# Patient Record
Sex: MALE | Race: WHITE | Employment: UNEMPLOYED | URBAN - METROPOLITAN AREA
[De-identification: names, ages, dates, MRNs, and addresses within clinical notes are randomized per-mention and may not be internally consistent; named-entity substitution may affect disease eponyms.]

---

## 2021-05-28 ENCOUNTER — APPOINTMENT (OUTPATIENT)
Dept: CT IMAGING | Age: 21
End: 2021-05-28
Payer: COMMERCIAL

## 2021-05-28 ENCOUNTER — HOSPITAL ENCOUNTER (EMERGENCY)
Age: 21
Discharge: HOME | End: 2021-05-28
Payer: COMMERCIAL

## 2021-05-28 VITALS
RESPIRATION RATE: 16 BRPM | TEMPERATURE: 98.4 F | OXYGEN SATURATION: 98 % | BODY MASS INDEX: 20.04 KG/M2 | WEIGHT: 140 LBS | HEIGHT: 70 IN | DIASTOLIC BLOOD PRESSURE: 67 MMHG | SYSTOLIC BLOOD PRESSURE: 132 MMHG | HEART RATE: 80 BPM

## 2021-05-28 DIAGNOSIS — R10.84 GENERALIZED ABDOMINAL PAIN: Primary | ICD-10-CM

## 2021-05-28 DIAGNOSIS — R18.8 PELVIC ASCITES: ICD-10-CM

## 2021-05-28 LAB
A/G RATIO: 2 (ref 1.1–2.2)
ALBUMIN SERPL-MCNC: 4.9 G/DL (ref 3.4–5)
ALP BLD-CCNC: 77 U/L (ref 40–129)
ALT SERPL-CCNC: 11 U/L (ref 10–40)
ANION GAP SERPL CALCULATED.3IONS-SCNC: 7 MMOL/L (ref 3–16)
AST SERPL-CCNC: 13 U/L (ref 15–37)
BASOPHILS ABSOLUTE: 0 K/UL (ref 0–0.2)
BASOPHILS RELATIVE PERCENT: 0.9 %
BILIRUB SERPL-MCNC: 1.2 MG/DL (ref 0–1)
BILIRUBIN URINE: NEGATIVE
BLOOD, URINE: ABNORMAL
BUN BLDV-MCNC: 9 MG/DL (ref 7–20)
CALCIUM SERPL-MCNC: 9.5 MG/DL (ref 8.3–10.6)
CHLORIDE BLD-SCNC: 104 MMOL/L (ref 99–110)
CLARITY: CLEAR
CO2: 28 MMOL/L (ref 21–32)
COLOR: YELLOW
CREAT SERPL-MCNC: 0.6 MG/DL (ref 0.9–1.3)
EOSINOPHILS ABSOLUTE: 0 K/UL (ref 0–0.6)
EOSINOPHILS RELATIVE PERCENT: 0.1 %
EPITHELIAL CELLS, UA: ABNORMAL /HPF (ref 0–5)
GFR AFRICAN AMERICAN: >60
GFR NON-AFRICAN AMERICAN: >60
GLOBULIN: 2.4 G/DL
GLUCOSE BLD-MCNC: 98 MG/DL (ref 70–99)
GLUCOSE URINE: NEGATIVE MG/DL
HCT VFR BLD CALC: 41.7 % (ref 40.5–52.5)
HEMOGLOBIN: 14.4 G/DL (ref 13.5–17.5)
KETONES, URINE: NEGATIVE MG/DL
LEUKOCYTE ESTERASE, URINE: NEGATIVE
LIPASE: 12 U/L (ref 13–60)
LYMPHOCYTES ABSOLUTE: 1.7 K/UL (ref 1–5.1)
LYMPHOCYTES RELATIVE PERCENT: 30.4 %
MCH RBC QN AUTO: 30.6 PG (ref 26–34)
MCHC RBC AUTO-ENTMCNC: 34.5 G/DL (ref 31–36)
MCV RBC AUTO: 88.7 FL (ref 80–100)
MICROSCOPIC EXAMINATION: YES
MONOCYTES ABSOLUTE: 0.3 K/UL (ref 0–1.3)
MONOCYTES RELATIVE PERCENT: 5.3 %
MUCUS: ABNORMAL /LPF
NEUTROPHILS ABSOLUTE: 3.6 K/UL (ref 1.7–7.7)
NEUTROPHILS RELATIVE PERCENT: 63.3 %
NITRITE, URINE: NEGATIVE
PDW BLD-RTO: 13.7 % (ref 12.4–15.4)
PH UA: 6 (ref 5–8)
PLATELET # BLD: 256 K/UL (ref 135–450)
PMV BLD AUTO: 8.6 FL (ref 5–10.5)
POTASSIUM REFLEX MAGNESIUM: 4.1 MMOL/L (ref 3.5–5.1)
PROTEIN UA: NEGATIVE MG/DL
RBC # BLD: 4.7 M/UL (ref 4.2–5.9)
RBC UA: ABNORMAL /HPF (ref 0–4)
SODIUM BLD-SCNC: 139 MMOL/L (ref 136–145)
SPECIFIC GRAVITY UA: 1.01 (ref 1–1.03)
TOTAL PROTEIN: 7.3 G/DL (ref 6.4–8.2)
URINE REFLEX TO CULTURE: ABNORMAL
URINE TYPE: ABNORMAL
UROBILINOGEN, URINE: 0.2 E.U./DL
WBC # BLD: 5.7 K/UL (ref 4–11)
WBC UA: ABNORMAL /HPF (ref 0–5)

## 2021-05-28 PROCEDURE — 74177 CT ABD & PELVIS W/CONTRAST: CPT

## 2021-05-28 PROCEDURE — 6360000004 HC RX CONTRAST MEDICATION: Performed by: PHYSICIAN ASSISTANT

## 2021-05-28 PROCEDURE — 99283 EMERGENCY DEPT VISIT LOW MDM: CPT

## 2021-05-28 PROCEDURE — 2580000003 HC RX 258: Performed by: PHYSICIAN ASSISTANT

## 2021-05-28 PROCEDURE — 83690 ASSAY OF LIPASE: CPT

## 2021-05-28 PROCEDURE — 80053 COMPREHEN METABOLIC PANEL: CPT

## 2021-05-28 PROCEDURE — 81001 URINALYSIS AUTO W/SCOPE: CPT

## 2021-05-28 PROCEDURE — 85025 COMPLETE CBC W/AUTO DIFF WBC: CPT

## 2021-05-28 RX ORDER — 0.9 % SODIUM CHLORIDE 0.9 %
1000 INTRAVENOUS SOLUTION INTRAVENOUS ONCE
Status: COMPLETED | OUTPATIENT
Start: 2021-05-28 | End: 2021-05-28

## 2021-05-28 RX ADMIN — SODIUM CHLORIDE 1000 ML: 9 INJECTION, SOLUTION INTRAVENOUS at 14:07

## 2021-05-28 RX ADMIN — IOPAMIDOL 75 ML: 755 INJECTION, SOLUTION INTRAVENOUS at 14:44

## 2021-05-28 ASSESSMENT — ENCOUNTER SYMPTOMS
ABDOMINAL PAIN: 1
RESPIRATORY NEGATIVE: 1

## 2021-05-28 ASSESSMENT — PAIN DESCRIPTION - LOCATION: LOCATION: BACK;ABDOMEN

## 2021-05-28 ASSESSMENT — PAIN DESCRIPTION - PAIN TYPE: TYPE: ACUTE PAIN

## 2021-05-28 ASSESSMENT — PAIN DESCRIPTION - DESCRIPTORS: DESCRIPTORS: ACHING;SHARP

## 2021-05-28 ASSESSMENT — PAIN SCALES - GENERAL: PAINLEVEL_OUTOF10: 7

## 2021-05-28 NOTE — ED PROVIDER NOTES
[address] ED  EMERGENCY DEPARTMENT ENCOUNTER        Pt Name: Flakito Moreno  MRN: [id number]  [place] 2000  Date of evaluation: 5/28/2021  Provider: Natalia Marcelo PA-C  PCP: JORGE MIKE  Note Started: 1:36 PM EDT       GIANNA. I have evaluated this patient. My supervising physician was available for consultation. CHIEF COMPLAINT       Chief Complaint   Patient presents with    Flank Pain     Pt states abdominal pain that started about a week ago. Pt states that it has radiated into his back. Pt states worsening pain when he stands.  Abdominal Pain       HISTORY OF PRESENT ILLNESS   (Location, Timing/Onset, Context/Setting, Quality, Duration, Modifying Factors, Severity, Associated Signs and Symptoms)  Note limiting factors. Flakito Moreno is a 23 y.o. male who presents with a Chief Complaint of neurolysed abdominal pain which radiates to his bilateral flanks. 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 Onset of symptoms x1 week. Duration of symptoms have been persistent since onset. Context includes generalized abdominal pain with bilateral flank pain. Rates his pain a 7 out of 10. Denies any fevers, chills, nausea vomiting or urinary symptoms. Denies chest pain or shortness of breath. He has not taken anything at this time for pain control. Otherwise denies any other complaints. Nothing seems to make symptoms better or worse. Denies recent abdominal surgeries or sick contacts. Nursing Notes were all reviewed and agreed with or any disagreements were addressed in the HPI. REVIEW OF SYSTEMS    (2-9 systems for level 4, 10 or more for level 5)     Review of Systems   Constitutional: Negative. Respiratory: Negative. Cardiovascular: Negative. Gastrointestinal: Positive for abdominal pain. Genitourinary: Positive for flank pain. Skin: Negative. Neurological: Negative.         Positives and Pertinent negatives as per HPI. Except as noted above in the ROS, all other systems were reviewed and negative. PAST MEDICAL HISTORY     Past Medical History:   Diagnosis Date    Kidney duplication          SURGICAL HISTORY   History reviewed. No pertinent surgical history. CURRENTMEDICATIONS       Previous Medications    No medications on file         ALLERGIES     Penicillins    FAMILYHISTORY     History reviewed. No pertinent family history. SOCIAL HISTORY       Social History     Tobacco Use    Smoking status: Current Every Day Smoker     Packs/day: 0.50     Types: Cigarettes    Smokeless tobacco: Never Used   Substance Use Topics    Alcohol use: Yes     Comment: occassionally    Drug use: No       SCREENINGS    Gucci Coma Scale  Eye Opening: Spontaneous  Best Verbal Response: Oriented  Best Motor Response: Obeys commands  [place] Coma Scale Score: 15        PHYSICAL EXAM    (up to 7 for level 4, 8 or more for level 5)     ED Triage Vitals [05/28/21 1208]   BP Temp Temp Source Pulse Resp SpO2 Height Weight   (!) 141/71 98.4 °F (36.9 °C) Oral 64 16 99 % 5' 10\" (1.778 m) 140 lb (63.5 kg)       Physical Exam  Vitals and nursing note reviewed. Constitutional:       Appearance: He is well-developed. He is not diaphoretic. HENT:      Head: Normocephalic and atraumatic. Nose: Nose normal.   Eyes:      General:         Right eye: No discharge. Left eye: No discharge. Cardiovascular:      Rate and Rhythm: Normal rate and regular rhythm. Heart sounds: Normal heart sounds. No murmur heard. No gallop. Pulmonary:      Effort: Pulmonary effort is normal. No respiratory distress. Breath sounds: Normal breath sounds. No wheezing or rales. Chest:      Chest wall: No tenderness. Musculoskeletal:         General: No deformity. Normal range of motion. Cervical back: Normal range of motion and neck supple. Skin:     General: Skin is warm and dry.    Neurological:      Mental Status: He is alert and oriented to person, place, and time. Psychiatric:         Behavior: Behavior normal.         DIAGNOSTIC RESULTS   LABS:    Labs Reviewed   COMPREHENSIVE METABOLIC PANEL W/ REFLEX TO MG FOR LOW K - Abnormal; Notable for the following components:       Result Value    CREATININE 0.6 (*)     Total Bilirubin 1.2 (*)     AST 13 (*)     All other components within normal limits    Narrative:     Performed at:  [facility] [address],  [place], [facility]   Phone [phone]   LIPASE - Abnormal; Notable for the following components:    Lipase 12.0 (*)     All other components within normal limits    Narrative:     Performed at:  [facility] [address],  [place], [place] [facility]   Phone [phone]   URINE RT REFLEX TO CULTURE - Abnormal; Notable for the following components:    Blood, Urine TRACE-INTACT (*)     All other components within normal limits    Narrative:     Performed at:  [facility]) [facility] [address],  [place], [employer]   Phone [phone]   MICROSCOPIC URINALYSIS - Abnormal; Notable for the following components:    Mucus, UA Rare (*)     All other components within normal limits    Narrative:     Performed at:  [facility] [address],  [place], [employer]   Phone [phone]   CBC WITH AUTO DIFFERENTIAL    Narrative:     Performed at:  [facility] [address],  [place], [employer]   Phone [phone]       All other labs were within normal range or not returned as of this dictation. EKG: All EKG's are interpreted by the Emergency Department Physician in the absence of a cardiologist.  Please see their note for interpretation of EKG.     RADIOLOGY:   Non-plain film images such as CT, Ultrasound and MRI are read by the radiologist. Plain radiographic images are visualized and preliminarily interpreted by the ED Provider with the below findings:        Interpretation per the Radiologist below, if available at the time of this note:    CT ABDOMEN PELVIS W IV CONTRAST Additional Contrast? None   Final Result   1 trace pelvic ascites. No results found. PROCEDURES   Unless otherwise noted below, none     Procedures    CRITICAL CARE TIME   N/A    CONSULTS:  None      EMERGENCY DEPARTMENT COURSE and DIFFERENTIAL DIAGNOSIS/MDM:   Vitals:    Vitals:    05/28/21 1208 05/28/21 1509   BP: (!) 141/71 132/67   Pulse: 64 80   Resp: 16 16   Temp: 98.4 °F (36.9 °C)    TempSrc: Oral    SpO2: 99% 98%   Weight: 140 lb (63.5 kg)    Height: 5' 10\" (1.778 m)        Patient was given the following medications:  Medications   0.9 % sodium chloride bolus (0 mLs Intravenous Stopped 5/28/21 1519)   iopamidol (ISOVUE-370) 76 % injection 75 mL (75 mLs Intravenous Given 5/28/21 1444)           Patient brought in today by private vehicle with complaints of generalized abdominal pain and bilateral flank pain. On exam he is alert oriented afebrile breathing on room air satting 99%. Nontoxic. No acute respiratory distress. Old labs records reviewed. Patient seen by myself and my attending was available for consultation. Urine is negative for infection. CBC shows no acute leukocytosis. Hemoglobin of 14.4. No acute electrolyte abnormalities. Kidney function unremarkable. Lipase of 12.    CT abdomen and pelvis reveals trace pelvic ascites. There is no adenopathy. Patient did not want anything here for symptomatic relief. Plan at this time will be to discharge home with outpatient follow-up to GI. Told to call in the next 1 to 3 days to follow-up with GI as an outpatient. I did discuss all findings including CT abdomen and pelvis findings. Patient verbalized understanding. I did feel comfortable sending this patient home with close follow-up instructions and strict return precautions.       Patient discharged in stable condition. He was told return immediately to the ED if he experience any new or worsening symptoms including not limited to increased pain, intractable nausea or vomiting, Thomas complaints or any new or worsening symptoms. Patient verbalized understanding of this plan. FINAL IMPRESSION      1. Generalized abdominal pain    2.  Pelvic ascites          DISPOSITION/PLAN   DISPOSITION Decision To Discharge 05/28/2021 03:16:41 PM      PATIENT REFERRED TO:  Vesta Sherrell  [address] [address] [address] [facility]  [phone]    Schedule an appointment as soon as possible for a visit in 1 day      Lauren Jack, [address] [address]  [address] [employer] [address]    Schedule an appointment as soon as possible for a visit in 1 day  For a recheck in  days      DISCHARGE MEDICATIONS:  New Prescriptions    No medications on file       DISCONTINUED MEDICATIONS:  Discontinued Medications    SUMATRIPTAN (IMITREX) 25 MG TABLET    Take 1 tablet by mouth once as needed for Migraine              (Please note that portions of this note were completed with a voice recognition program.  Efforts were made to edit the dictations but occasionally words are mis-transcribed.)    Nedra Jackson PA-C (electronically signed)            Nedra Jackson PA-C  05/28/21 3038

## 2021-09-01 ENCOUNTER — HOSPITAL ENCOUNTER (INPATIENT)
Age: 21
LOS: 2 days | Discharge: HOME | DRG: 753 | End: 2021-09-03
Attending: STUDENT IN AN ORGANIZED HEALTH CARE EDUCATION/TRAINING PROGRAM | Admitting: PSYCHIATRY & NEUROLOGY
Payer: COMMERCIAL

## 2021-09-01 DIAGNOSIS — F32.A DEPRESSION, UNSPECIFIED DEPRESSION TYPE: Primary | ICD-10-CM

## 2021-09-01 PROBLEM — F39 MOOD DISORDER (HCC): Status: ACTIVE | Noted: 2021-09-01

## 2021-09-01 LAB
A/G RATIO: 2 (ref 1.1–2.2)
ACETAMINOPHEN LEVEL: <5 UG/ML (ref 10–30)
ALBUMIN SERPL-MCNC: 5.3 G/DL (ref 3.4–5)
ALP BLD-CCNC: 86 U/L (ref 40–129)
ALT SERPL-CCNC: 17 U/L (ref 10–40)
AMPHETAMINE SCREEN, URINE: POSITIVE
ANION GAP SERPL CALCULATED.3IONS-SCNC: 11 MMOL/L (ref 3–16)
AST SERPL-CCNC: 18 U/L (ref 15–37)
BARBITURATE SCREEN URINE: ABNORMAL
BASOPHILS ABSOLUTE: 0.1 K/UL (ref 0–0.2)
BASOPHILS RELATIVE PERCENT: 0.6 %
BENZODIAZEPINE SCREEN, URINE: ABNORMAL
BILIRUB SERPL-MCNC: 1.8 MG/DL (ref 0–1)
BUN BLDV-MCNC: 15 MG/DL (ref 7–20)
CALCIUM SERPL-MCNC: 10.1 MG/DL (ref 8.3–10.6)
CANNABINOID SCREEN URINE: POSITIVE
CHLORIDE BLD-SCNC: 99 MMOL/L (ref 99–110)
CO2: 28 MMOL/L (ref 21–32)
COCAINE METABOLITE SCREEN URINE: ABNORMAL
CREAT SERPL-MCNC: 0.8 MG/DL (ref 0.9–1.3)
EOSINOPHILS ABSOLUTE: 0 K/UL (ref 0–0.6)
EOSINOPHILS RELATIVE PERCENT: 0.4 %
ETHANOL: NORMAL MG/DL (ref 0–0.08)
GFR AFRICAN AMERICAN: >60
GFR NON-AFRICAN AMERICAN: >60
GLOBULIN: 2.6 G/DL
GLUCOSE BLD-MCNC: 108 MG/DL (ref 70–99)
HCT VFR BLD CALC: 43.7 % (ref 40.5–52.5)
HEMOGLOBIN: 14.7 G/DL (ref 13.5–17.5)
LYMPHOCYTES ABSOLUTE: 1.2 K/UL (ref 1–5.1)
LYMPHOCYTES RELATIVE PERCENT: 14.7 %
Lab: ABNORMAL
MCH RBC QN AUTO: 30.1 PG (ref 26–34)
MCHC RBC AUTO-ENTMCNC: 33.6 G/DL (ref 31–36)
MCV RBC AUTO: 89.6 FL (ref 80–100)
METHADONE SCREEN, URINE: ABNORMAL
MONOCYTES ABSOLUTE: 0.5 K/UL (ref 0–1.3)
MONOCYTES RELATIVE PERCENT: 6.2 %
NEUTROPHILS ABSOLUTE: 6.2 K/UL (ref 1.7–7.7)
NEUTROPHILS RELATIVE PERCENT: 78.1 %
OPIATE SCREEN URINE: ABNORMAL
OXYCODONE URINE: ABNORMAL
PDW BLD-RTO: 13.2 % (ref 12.4–15.4)
PH UA: 5
PHENCYCLIDINE SCREEN URINE: ABNORMAL
PLATELET # BLD: 317 K/UL (ref 135–450)
PMV BLD AUTO: 8.1 FL (ref 5–10.5)
POTASSIUM REFLEX MAGNESIUM: 4.1 MMOL/L (ref 3.5–5.1)
PROPOXYPHENE SCREEN: ABNORMAL
RBC # BLD: 4.87 M/UL (ref 4.2–5.9)
SALICYLATE, SERUM: <0.3 MG/DL (ref 15–30)
SARS-COV-2, NAAT: NOT DETECTED
SODIUM BLD-SCNC: 138 MMOL/L (ref 136–145)
TOTAL PROTEIN: 7.9 G/DL (ref 6.4–8.2)
TSH SERPL DL<=0.05 MIU/L-ACNC: 1.03 UIU/ML (ref 0.27–4.2)
WBC # BLD: 8 K/UL (ref 4–11)

## 2021-09-01 PROCEDURE — 99284 EMERGENCY DEPT VISIT MOD MDM: CPT

## 2021-09-01 PROCEDURE — 80053 COMPREHEN METABOLIC PANEL: CPT

## 2021-09-01 PROCEDURE — 87635 SARS-COV-2 COVID-19 AMP PRB: CPT

## 2021-09-01 PROCEDURE — 1240000000 HC EMOTIONAL WELLNESS R&B

## 2021-09-01 PROCEDURE — 6370000000 HC RX 637 (ALT 250 FOR IP): Performed by: PSYCHIATRY & NEUROLOGY

## 2021-09-01 PROCEDURE — 80061 LIPID PANEL: CPT

## 2021-09-01 PROCEDURE — 80307 DRUG TEST PRSMV CHEM ANLYZR: CPT

## 2021-09-01 PROCEDURE — 80143 DRUG ASSAY ACETAMINOPHEN: CPT

## 2021-09-01 PROCEDURE — 84443 ASSAY THYROID STIM HORMONE: CPT

## 2021-09-01 PROCEDURE — 80179 DRUG ASSAY SALICYLATE: CPT

## 2021-09-01 PROCEDURE — 85025 COMPLETE CBC W/AUTO DIFF WBC: CPT

## 2021-09-01 PROCEDURE — 36415 COLL VENOUS BLD VENIPUNCTURE: CPT

## 2021-09-01 PROCEDURE — 83036 HEMOGLOBIN GLYCOSYLATED A1C: CPT

## 2021-09-01 PROCEDURE — 82077 ASSAY SPEC XCP UR&BREATH IA: CPT

## 2021-09-01 RX ORDER — MAGNESIUM HYDROXIDE/ALUMINUM HYDROXICE/SIMETHICONE 120; 1200; 1200 MG/30ML; MG/30ML; MG/30ML
30 SUSPENSION ORAL EVERY 6 HOURS PRN
Status: DISCONTINUED | OUTPATIENT
Start: 2021-09-01 | End: 2021-09-03 | Stop reason: HOSPADM

## 2021-09-01 RX ORDER — OLANZAPINE 10 MG/1
10 INJECTION, POWDER, LYOPHILIZED, FOR SOLUTION INTRAMUSCULAR 3 TIMES DAILY PRN
Status: DISCONTINUED | OUTPATIENT
Start: 2021-09-01 | End: 2021-09-03 | Stop reason: HOSPADM

## 2021-09-01 RX ORDER — ACETAMINOPHEN 325 MG/1
650 TABLET ORAL EVERY 4 HOURS PRN
Status: DISCONTINUED | OUTPATIENT
Start: 2021-09-01 | End: 2021-09-03 | Stop reason: HOSPADM

## 2021-09-01 RX ORDER — NICOTINE 21 MG/24HR
1 PATCH, TRANSDERMAL 24 HOURS TRANSDERMAL DAILY
Status: DISCONTINUED | OUTPATIENT
Start: 2021-09-01 | End: 2021-09-03 | Stop reason: HOSPADM

## 2021-09-01 RX ORDER — BENZTROPINE MESYLATE 1 MG/ML
2 INJECTION INTRAMUSCULAR; INTRAVENOUS 2 TIMES DAILY PRN
Status: DISCONTINUED | OUTPATIENT
Start: 2021-09-01 | End: 2021-09-03 | Stop reason: HOSPADM

## 2021-09-01 RX ORDER — POLYETHYLENE GLYCOL 3350 17 G
2 POWDER IN PACKET (EA) ORAL
Status: DISCONTINUED | OUTPATIENT
Start: 2021-09-01 | End: 2021-09-03 | Stop reason: HOSPADM

## 2021-09-01 RX ORDER — OLANZAPINE 5 MG/1
5 TABLET ORAL EVERY 4 HOURS PRN
Status: DISCONTINUED | OUTPATIENT
Start: 2021-09-01 | End: 2021-09-03 | Stop reason: HOSPADM

## 2021-09-01 RX ORDER — HYDROXYZINE PAMOATE 25 MG/1
50 CAPSULE ORAL EVERY 6 HOURS PRN
Status: DISCONTINUED | OUTPATIENT
Start: 2021-09-01 | End: 2021-09-03 | Stop reason: HOSPADM

## 2021-09-01 RX ADMIN — HYDROXYZINE PAMOATE 50 MG: 25 CAPSULE ORAL at 21:05

## 2021-09-01 RX ADMIN — NICOTINE POLACRILEX 2 MG: 2 LOZENGE ORAL at 21:05

## 2021-09-01 ASSESSMENT — SLEEP AND FATIGUE QUESTIONNAIRES
DO YOU HAVE DIFFICULTY SLEEPING: NO
DO YOU USE A SLEEP AID: NO
AVERAGE NUMBER OF SLEEP HOURS: 6

## 2021-09-01 ASSESSMENT — PAIN SCALES - GENERAL: PAINLEVEL_OUTOF10: 0

## 2021-09-01 NOTE — ED TRIAGE NOTES
Pt denies and drug or alcohol use. Pt states that it has been a rough month and that he normally doesn't have these thoughts.
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
## 2021-09-02 LAB
CHOLESTEROL, TOTAL: 185 MG/DL (ref 0–199)
EKG ATRIAL RATE: 62 BPM
EKG DIAGNOSIS: NORMAL
EKG P AXIS: 82 DEGREES
EKG P-R INTERVAL: 150 MS
EKG Q-T INTERVAL: 418 MS
EKG QRS DURATION: 96 MS
EKG QTC CALCULATION (BAZETT): 424 MS
EKG R AXIS: 64 DEGREES
EKG T AXIS: 63 DEGREES
EKG VENTRICULAR RATE: 62 BPM
HDLC SERPL-MCNC: 73 MG/DL (ref 40–60)
LDL CHOLESTEROL CALCULATED: 100 MG/DL
TRIGL SERPL-MCNC: 62 MG/DL (ref 0–150)
VLDLC SERPL CALC-MCNC: 12 MG/DL

## 2021-09-02 PROCEDURE — 93010 ELECTROCARDIOGRAM REPORT: CPT | Performed by: INTERNAL MEDICINE

## 2021-09-02 PROCEDURE — 6370000000 HC RX 637 (ALT 250 FOR IP): Performed by: PSYCHIATRY & NEUROLOGY

## 2021-09-02 PROCEDURE — 99223 1ST HOSP IP/OBS HIGH 75: CPT | Performed by: PSYCHIATRY & NEUROLOGY

## 2021-09-02 PROCEDURE — 99221 1ST HOSP IP/OBS SF/LOW 40: CPT | Performed by: NURSE PRACTITIONER

## 2021-09-02 PROCEDURE — 93005 ELECTROCARDIOGRAM TRACING: CPT | Performed by: PSYCHIATRY & NEUROLOGY

## 2021-09-02 PROCEDURE — 1240000000 HC EMOTIONAL WELLNESS R&B

## 2021-09-02 RX ADMIN — SERTRALINE 25 MG: 50 TABLET, FILM COATED ORAL at 17:08

## 2021-09-02 RX ADMIN — NICOTINE POLACRILEX 2 MG: 2 LOZENGE ORAL at 17:33

## 2021-09-02 ASSESSMENT — LIFESTYLE VARIABLES: HISTORY_ALCOHOL_USE: YES

## 2021-09-02 ASSESSMENT — PATIENT HEALTH QUESTIONNAIRE - PHQ9: SUM OF ALL RESPONSES TO PHQ QUESTIONS 1-9: 9

## 2021-09-02 ASSESSMENT — SLEEP AND FATIGUE QUESTIONNAIRES
DO YOU USE A SLEEP AID: NO
AVERAGE NUMBER OF SLEEP HOURS: 6
DO YOU HAVE DIFFICULTY SLEEPING: NO

## 2021-09-02 ASSESSMENT — PAIN SCALES - GENERAL: PAINLEVEL_OUTOF10: 0

## 2021-09-02 NOTE — FLOWSHEET NOTE
09/02/21 1333   Activities of Daily Living   Patient Requires assistance with daily self-care activities? No   Leisure Activity 1   3 Favorite Leisure Activities \"sit at house and get high\"   Frequency > 2 hours/day   Last time this week   Leisure Activity 2   [address]  go to bar and play pool   Frequency  weekly   Last time  this month   Leisure Activity 3   [address]  take a walk in the woods   Frequency  weekly   Last time  this month   Social   Patient reports spending the majority of their free time alone   Patient verbalizes a preference for spending free time alone   Patients perception of support system less healthy   Patients perception of barriers to socializing with others include(s) lack of skills;lack of motivation/interest;transportation;finances   Social Details Pt reports increased social isolation with incresaed drug intake. Beliefs & Coping   Has difficulty dealing with feelings   Yes   Internalizes feelings/Keeps feelings in No   Externalizes feelings through aggressiveness or poor temper control  Yes   Feels uncomfortable around others  No   Has difficulty talking to others  No   Depends on others for direction or decisions No   Difficulty dealing with anger of others  Yes   Difficulty dealing with own anger  Yes   Difficulty managing stress Yes   Frequently has difficulty with relationships  Yes   which,who,where with friends/peers   Has recently perceived/experienced loss, disappointment, humiliation or failure  Yes   General perception about self likes self   Attitude about abilities occasionally fails   Locus of Control  most of the time   Belief about recovery Recovery is possible   Patient Identified Strengths  athletic, cool, social   Patient Identified Limitations  substance misuse   Perception of most stressful event prior to hospitalization \"I just need to get my shit right. \"   Perception of changes needed Pt voices desire to correct the relationship with his son's mother. Strengths and Limitations   Strengths Independent in basic self-care activities   Limitations Difficult relationships / poor social skills;Unrealistic self-view;General negative or hopeless attitude about future/recovery; Inappropriate/potentially harmful leisure interests; Hopeless about future     Completed by Amada Licona MM, MT-BC

## 2021-09-02 NOTE — H&P
[address]                 [address] [address]                              HISTORY AND PHYSICAL    PATIENT NAME: VERA JACKSON                   :        2000  MED REC NO:   [id number]                          ROOM:       2306  ACCOUNT NO:   [de-identified]                           ADMIT DATE: 2021  PROVIDER:     Campos Hawkins MD    IDENTIFICATION:  This is a domiciled, never , employed  70-year-old without psychiatric history who was brought by squad to our  Emergency Department after making suicidal statements. SOURCES OF INFORMATION:  The patient. ED record. CHIEF COMPLAINT:  \"I texted my girlfriend something. \"    HISTORY OF PRESENT ILLNESS:  The patient reports that he and his   girlfriend had lived together with their son. They have been arguing  recently. Evidently, she broke up with him though they  continued to live together. For the last couple of weeks, he has tried  to reconcile with her unsuccessfully. At some point, she left with her  son to stay with the patient's sister. He continued to try to reconcile  with her. Yesterday, he made a suicidal threas to her over text. When   police showed up to his apartment they found a loaded gun on the TV  stand. He had a long scratch on his arm. The police secured the  Firearm; brought it to the Police Department. Squad then brought him to  our Emergency Department. The patient reports he felt he had been doing relatively well up until  the breakup. Since then, he has struggled with a number of  symptoms including low mood, anhedonia, tearfulness, amotivation, and some  self-reproach. He says that yesterday was the first time he had any  sort of suicidal thought. He was not suicidal before he  made the text and is not suicidal now. He has some  vegetative symptoms of depression including limited eye contact and psychomotor retardation.     He says that he occasionally drinks alcohol and has a history of  substances, but had not been using recently. However, his urine drug  screen was positive for both amphetamines and cannabis. He reports  feeling safe here and willing approach staff with concerns. PSYCHIATRIC REVIEW OF SYSTEMS:  No psychosis. No michael. STRESSORS:  As above. PSYCHIATRIC HISTORY:  He says he saw a psychiatrist as a toddler, but  not since. He says, he has never been hospitalized, diagnosed with mental illness, on psychiatric medications, or attempted suicide. SUBSTANCE USE HISTORY:  He tells me he has a history of significant  substance use, but would not elaborate. He says that recently he has  only had alcohol. However, his urine drug screen was positive for  amphetamines and cannabis. MEDICAL HISTORY:  No chronic conditions, traumatic brain injury,  seizures, or major surgeries. FAMILY PSYCHIATRIC HISTORY:  Paternal grandmother, something, unknown  diagnosis. No suicides. CURRENT MEDICATIONS:  None. ALLERGIES:  PENICILLIN. SOCIAL HISTORY:  Born in [place]. Three siblings. Parents never  . He completed the eleventh grade. He had been living with his  Girlfriend, and their son. He has never been . He is employed  full-time as a . LEGAL HISTORY:  None. REVIEW OF SYSTEMS:  He is not vaccinated for COVID, he does not believe  he needs it. He did not describe or endorse recent headaches, changes  in vision, chest pain, shortness of breath, cough, sore throat, fevers,  abdominal pain, neurological problems, bleeding problems, or skin  problems. He was moving all four extremities and speaking without  difficulty. MENTAL STATUS EXAMINATION:  The patient presented in personal clothes. He made little eye contact and was guarded, but opened with  conversation. Eventually, he made better eye contact. He described his  mood as, \"low\" and had a blunted affect.   He had mild psychomotor  retardation. He spoke softly. He was not pressured. He was oriented to the date,  day, place, and the context of this evaluation. His memory was intact. His use of language, speech, and educational attainment suggested a  below average level of intellectual functioning. His thought processes were logical and goal-directed. He did not  describe or endorse hallucinations, delusions, or homicidal thinking. He did endorse brief suicidal thinking, but reported feeling safe here  and willing to approach staff with concerns. His ability for abstract thought was fair based on his interpretation of  simple proverbs. Insight and judgment were fair. PHYSICAL EXAMINATION:  VITAL SIGNS:  Temperature 99.1, pulse 71, respiratory rate 16, blood  pressure 93/51. GAIT:  Normal.    LABORATORY DATA:  Show a CMP with a creatinine less than 0.8, glucose of  108, albumin at 5.3, bilirubin at 1.8, otherwise, within normal limits. TSH, within normal limits. Ethanol level, not detectable. Urine drug  screen, positive for amphetamine and cannabis. Acetaminophen and  salicylate levels are below threshold. CBC, within normal limits. COVID-19, negative. FORMULATION:  This is a domiciled, never , employed 42-year-old  with a history of substance use, who was brought by squad to our  Emergency Department on a statement of belief after making suicidal  statements to his girlfriend. The patient presents with symptoms of  depression and grief. Given his behavior including making suicidal  statements to his girlfriend, self-harm, and involvement with loaded  gun, he was be admitted for further observation and  evaluation. DIAGNOSES:  1. Mood disorder, unspecified. 2.  Cannabis and amphetamine use. PLAN:  1. Admit to Inpatient Psychiatry for stabilization and treatment. 2.  Start Zoloft 25 mg today, increase to 50 mg tomorrow.   Risks,  benefits, and side effects were discussed with the patient at length. Ordered q. 15-minute checks for safety, programming, and p.r.n.  medication for anxiety, agitation, and insomnia. 3.  Internal Medicine consult for admission. 4.  Collateral information for diagnostic clarification, risk  stratification, and care coordination. 5.  Estimated length of stay 3 to 5 days for stabilization. The patient  was admitted on a statement of belief. At this point, he is willing to  stay voluntarily. A total of 70 minutes was spent with the patient completing this  evaluation and more than 50% of the time was spent completing this  evaluation, providing counseling, and planning treatment with the  patient.         Romeo Díaz MD    D: 09/02/2021 17:07:04       T: 09/02/2021 17:11:11     [email or web address]  Job#: [id number]     Doc#: [id number]    CC:

## 2021-09-02 NOTE — BH NOTE
[facility]  Treatment Team Note  Day 1    Review Date & Time: 0900 9/2/2021    Patient was not in treatment team      Status EXAM:   Status and Exam  Normal: No  Facial Expression: Worried  Affect: Congruent (evasive)  Level of Consciousness: Alert  Mood:Normal: No  Mood: Irritable  Motor Activity:Normal: Yes  Interview Behavior: Evasive, Irritable  Preception: [place] to Person, [place] to Time, [place] to Place, [place] to Situation  Attention:Normal: Yes  Thought Content:Normal: Yes  Thought Content:  (linear)  Hallucinations: None  Delusions: No  Memory:Normal: Yes  Insight and Judgment: No  Insight and Judgment: Poor Judgment, Poor Insight  Present Suicidal Ideation: No  Present Homicidal Ideation: No      Suicide Risk CSSR-S:  1) Within the past month, have you wished you were dead or wished you could go to sleep and not wake up? : No  2) Have you actually had any thoughts of killing yourself? : No  3) Have you been thinking about how you might kill yourself? : No  5) Have you started to work out or worked out the details of how to kill yourself? Do you intend to carry out this plan? : No  6) Have you ever done anything, started to do anything, or prepared to do anything to end your life?: No      PLAN/TREATMENT RECOMMENDATIONS UPDATE: Patient will take medication as prescribed, eat 75% of meals, attend groups, participate in milieu activities, participate in treatment team and care planning for discharge and follow up.         Smiley Leonard RN

## 2021-09-02 NOTE — BH NOTE
[id number] [facility]  Admission Note     Admission Type:   Admission Type:  Involuntary    Reason for admission:  Reason for Admission: threatened suicide with loaded gun    PATIENT STRENGTHS:  Strengths: No significant Physical Illness, Employment    Patient Strengths and Limitations:  Limitations: Difficulty problem solving/relies on others to help solve problems, Inappropriate/potentially harmful leisure interests, Difficult relationships / poor social skills    Medical Problems:   Past Medical History:   Diagnosis Date    Kidney duplication        Status EXAM:  Status and Exam  Normal: No  Facial Expression: Worried  Affect: Constricted, Congruent  Level of Consciousness: Alert  Mood:Normal: Yes (irritable, congruent with affect)  Mood: Irritable  Motor Activity:Normal: Yes  Interview Behavior: Irritable (minimizing)  Preception: [place] to Person, [place] to Time, [place] to Place, [place] to Situation  Attention:Normal: Yes  Thought Content:Normal: Yes  Hallucinations: None  Delusions: No  Memory:Normal: Yes  Insight and Judgment: No  Insight and Judgment: Poor Judgment, Poor Insight  Present Suicidal Ideation: No  Present Homicidal Ideation: No    Tobacco Screening:  Practical Counseling, on admission, kiran X, if applicable and completed (first 3 are required if patient doesn't refuse):            (X)  Recognizing danger situations (included triggers and roadblocks)   (X)  Coping skills (new ways to manage stress, exercise, relaxation techniques, changing routine, distraction)                                               (X)  Basic information about quitting (benefits of quitting, techniques in how to quit, available resources  ( ) Referral for counseling faxed to [place]            ( ) Patient refused counseling  ( ) Patient has not smoked in the last 30 days    Metabolic Screening:    No results found for: LABA1C    No results found for: CHOL  No results found for: TRIG  No results found for: HDL  No components found for: LDLCAL  No results found for: LABVLDL      Body mass index is 20.09 kg/m². BP Readings from Last 2 Encounters:   09/01/21 127/84   05/28/21 132/67       Pt admitted with followings belongings:  Dentures: None  Vision - Corrective Lenses: None  Hearing Aid: None  Jewelry: None  Body Piercings Removed: No  Clothing: Pants, Shirt, Footwear  Were All Patient Medications Collected?: Not Applicable  Other Valuables: Cell phone, Shaune Pay     Patient's home medications were verified. Patient oriented to surroundings and program expectations and copy of patient rights given. Received admission packet:  yes. Consents reviewed, signed no. Refused all paperwork at this time. Patient verbalize understanding:  yes.   Patient education on precautions: yes                   Dat Hamilton RN

## 2021-09-02 NOTE — FLOWSHEET NOTE
09/02/21 1530   Psychiatric History   Psychiatric history treatment   (no history)   Are there any medication issues? No   Support System   Support system Adequate   Types of Support System Mother;Father;Friend   Current Living Situation   Home Living Adequate   Living information Lives with others   Problems with living situation  No   Lack of basic needs No   SSDI/SSI none   Other government assistance none   Problems with environment none   Current abuse issues none   Supervised setting None   Relationship problems No   Medical and Self-Care Issues   Relevant medical problems kidney problems   Relevant self-care issues none   Family [place]   Spouse/partner-name/age none recent breakup   Children-names/ages Laveen 2   Parents Kayley [phone]; David Jean Lower   Siblings Evelin Eason Vicky   Comment none   Childhood   Raised by Biological mother;Biological father   Relevant family history reports a good childhood   History of abuse No   Legal History   Legal history No   Juvenile legal history No    Abuse Assessment   Physical Abuse Denies   Verbal Abuse Denies   Emotional abuse Denies   Financial Abuse Denies   Sexual abuse Denies   Elder abuse No   Substance Use   Use of substances  Yes   Substance 1   Substance used Alcohol   Amount/frequency/route 1-2x per week, 1-3 drinks per sitting,  denies having a problem   Motivation for SA Treatment   Stage of engagement Pre-engagement/engagement   Motivation for treatment No   Education   Education HS graduate -GED   Work History   Currently employed Yes  ()   /VA involvement none   Leisure/Activity   Past interests sports, hiking, bar, hiking   Present interests none besides playing with his son   Current daily activity work and come home and take care of son   Social with friends/family No   Cultural and Spiritual   Spiritual concerns No   Cultural concerns No   Completed psychosocial assessment.   Denies AoD concerns but has positive tox screen for multiple substances.

## 2021-09-02 NOTE — PLAN OF CARE
Problem: Altered Mood, Depressive Behavior:  Goal: Able to verbalize and/or display a decrease in depressive symptoms  Outcome: Ongoing   1:1 Assessment completed, see flow sheet. Patient is alert and oriented x4. Patient has been visible on the unit this shift and is dressed appropriately in street clothing. Patient denied SI/HI/AVH. The patient reported his mood as\"fine\". Patient did attend group this shift and participated appropriately.

## 2021-09-02 NOTE — GROUP NOTE
Group Therapy Note    Date: 9/1/2021    Group Start Time: 2020  Group End Time: 2050  Group Topic: Wrap-Up    [facility]        Group Therapy Note    Attendees: Goals and importance of goal setting discussed. Night time milieu activities discussed. Patient's Goal:  Go home    Notes:       Status After Intervention:  Improved    Participation Level:  Active Listener    Participation Quality: Attentive      Speech:  normal      Thought Process/Content: Logical  Linear      Affective Functioning: Congruent      Mood: euthymic      Level of consciousness:  Alert and Oriented x4      Response to Learning: Progressing to goal      Endings: None Reported    Modes of Intervention: Support      Discipline Responsible: RMI      Signature:  Rolando Johnson

## 2021-09-02 NOTE — BH NOTE
Patient has superficial lac to right forearm. He does admit this was self inflicted and states this is the first time he has ever self harmed.

## 2021-09-02 NOTE — H&P
Hospital Medicine History & Physical      PCP: JORGE MIKE    Date of Admission: 9/1/2021    Date of Service: Pt seen/examined on 9/2/2021     Chief Complaint:    Chief Complaint   Patient presents with    Suicidal     Pt brought in by PD for suicidal ideation. PD states that pt stated that \"he was going to pull the trigger all the way and to tell his son that he loves him. \"       History Of Present Illness: The patient is a 23 y.o. male with kidney duplication who presented to [facility] ED with complaint of SI. Patient was seen and evaluated in the ED by the ED medical provider, patient was medically cleared for admission to [facility] at [facility]. This note serves as an admission medical H&P.    PCP: JORGE MIKE  Tobacco use: current  ETOH use: occasional  Illicit drug use: denies    Patient denies any symptoms/complaints. Past Medical History:        Diagnosis Date    Kidney duplication        Past Surgical History:    History reviewed. No pertinent surgical history. Medications Prior to Admission:    Prior to Admission medications    Not on File       Allergies:  Penicillins    Social History:    TOBACCO:   reports that he has been smoking cigarettes. He has a 12.00 pack-year smoking history. He has never used smokeless tobacco.  ETOH:   reports current alcohol use. Family History:   Positive as follows:    History reviewed. No pertinent family history.     REVIEW OF SYSTEMS:       Constitutional: Negative for fever   HENT: Negative for sore throat   Respiratory: Negative  for dyspnea, cough   Cardiovascular: Negative for chest pain   Gastrointestinal: Negative for vomiting, diarrhea   Genitourinary: Negative for dysuria  Musculoskeletal: Negative for arthralgias   Skin: Negative for rash   Neurological: Negative for syncope   Psychiatric/Behavorial: per psychiatric evaluation    PHYSICAL EXAM:    /84   Pulse 90   Temp 97.5 °F (36.4 °C) (Temporal)   Resp 14   Ht 5' 10\" (1.778 m)   Wt 140 lb (63.5 kg)   SpO2 92%   BMI 20.09 kg/m²     Gen: No distress. Alert. Eyes: PERRL. No sclera icterus. No conjunctival injection. ENT: No discharge. Pharynx clear. Neck: No JVD. No Carotid Bruit. Trachea midline. Resp: No accessory muscle use. No crackles. No wheezes. No rhonchi. CV: Regular rate. Regular rhythm. No murmur. No rub. No edema. GI: Non-tender. Non-distended. Normal bowel sounds. Skin: Warm and dry. No nodule on exposed extremities. No rash on exposed extremities. M/S: No cyanosis. No joint deformity. No clubbing. Neuro: Awake. No focal neurologic deficit on exam.  Cranial nerves II through XII intact. Patient is able to ambulate without difficulty. Psych: Per psychiatry team evaluation       CBC:   Recent Labs     09/01/21  1330   WBC 8.0   HGB 14.7   HCT 43.7   MCV 89.6        BMP:   Recent Labs     09/01/21  1330      K 4.1   CL 99   CO2 28   BUN 15   CREATININE 0.8*     LIVER PROFILE:   Recent Labs     09/01/21  1330   AST 18   ALT 17   BILITOT 1.8*   ALKPHOS 86     UA:  Recent Labs     09/01/21  1250   PHUR 5.0         CULTURES  COVID: not detected    EKG:  I have reviewed the EKG with the following interpretation:   Normal sinus rhythm with sinus arrhythmiaMinimal voltage criteria for LVH, may be normal variantST elevation, consider early repolarization, pericarditis, or injuryAbnormal      ASSESSMENT/PLAN:  Cannabis use  Methamphetamine use  - recommend cessation    Tobacco Dependence  -Recommended cessation  - nicotine patch ordered     Mood disorder  - management per psychiatry. Pt has no medical complaints at this time. They were informed that should a medical concern arise during their admission they may have [facility] contact us.     Agatha Nelson FNSILVANA-C  9/2/2021 8:58 AM

## 2021-09-02 NOTE — PROGRESS NOTES
Behavioral Services  Medicare Certification Upon Admission    I certify that this patient's inpatient psychiatric hospital admission is medically necessary for:    [x] (1) Treatment which could reasonably be expected to improve this patient's condition,       [x] (2) Or for diagnostic study;     AND     [x](2) The inpatient psychiatric services are provided while the individual is under the care of a physician and are included in the individualized plan of care.     Estimated length of stay/service 3-5 days    Plan for post-hospital care incomplete     Electronically signed by Fernande Simmonds, MD on 9/2/2021 at 5:07 PM

## 2021-09-03 VITALS
RESPIRATION RATE: 16 BRPM | BODY MASS INDEX: 20.04 KG/M2 | HEART RATE: 61 BPM | SYSTOLIC BLOOD PRESSURE: 120 MMHG | DIASTOLIC BLOOD PRESSURE: 56 MMHG | TEMPERATURE: 99.1 F | OXYGEN SATURATION: 97 % | HEIGHT: 70 IN | WEIGHT: 140 LBS

## 2021-09-03 PROBLEM — F11.20 OPIOID USE DISORDER, MODERATE, IN CONTROLLED ENVIRONMENT (HCC): Status: ACTIVE | Noted: 2021-09-03

## 2021-09-03 LAB
ESTIMATED AVERAGE GLUCOSE: 96.8 MG/DL
HBA1C MFR BLD: 5 %

## 2021-09-03 PROCEDURE — 5130000000 HC BRIDGE APPOINTMENT

## 2021-09-03 PROCEDURE — 99239 HOSP IP/OBS DSCHRG MGMT >30: CPT | Performed by: PSYCHIATRY & NEUROLOGY

## 2021-09-03 PROCEDURE — 6370000000 HC RX 637 (ALT 250 FOR IP): Performed by: PSYCHIATRY & NEUROLOGY

## 2021-09-03 RX ORDER — NICOTINE 21 MG/24HR
1 PATCH, TRANSDERMAL 24 HOURS TRANSDERMAL DAILY
Qty: 15 PATCH | Refills: 0 | Status: SHIPPED | OUTPATIENT
Start: 2021-09-04 | End: 2021-09-19

## 2021-09-03 RX ADMIN — SERTRALINE HYDROCHLORIDE 50 MG: 50 TABLET ORAL at 09:17

## 2021-09-03 NOTE — SUICIDE SAFETY PLAN
SAFETY PLAN    A suicide Safety Plan is a document that supports someone when they are having thoughts of suicide. Warning Signs that indicate a suicidal crisis may be developing: What (situations, thoughts, feelings, body sensations, behaviors, etc.) do you experience that lets you know you are beginning to think about suicide? 1. Negative thoughts  2.   3. Internal Coping Strategies:  What things can I do (relaxation techniques, hobbies, physical activities, etc.) to take my mind off my problems without contacting another person? 1. Stay busy  2. walk  3. sleep  People and social settings that provide distraction: Who can I call or where can I go to distract me? 1. Name: son Phone:   2. Name:Shyanne Phone:   3. Place:             4. Place:     People whom I can ask for help: Who can I call when I need help - for example, friends, family, clergy, someone else? 1. Name: my son               Phone:  2. Name: Hilario nAguiano Phone:   3. Name:   Phone:     Professionals or [address] [place] [address] I can contact during a crisis: Who can I call for help - for example, my doctor, my psychiatrist, my psychologist, a mental health provider, a suicide hotline? 1. Clinician Name: Clementina Jacobs Phone: [phone]      Clinician Pager or Emergency Contact #:     3. Suicide Prevention Lifeline: 9-005-924-TALK (4542)    4. [address] [place] [address] Emergency Services -  for example, [address] [facility] suicide hotline, [facility] Hotline:  Leela Caraballo  Phone: [phone]      Clinician Pager or Emergency Contact #: [phone]    Making the environment safe: How can I make my environment (house/apartment/living space) safer? For example, can I remove guns, medications, and other items?   1. Stay away from negative people

## 2021-09-03 NOTE — BH NOTE
hospitalization                  ( )  Recognizing danger situations (included triggers and roadblocks), if not completed on admission                    ( )  Coping skills (new ways to manage stress, exercise, relaxation techniques, changing routine, distraction), if not completed on admission                                                           ( )  Basic information about quitting (benefits of quitting, techniques in how to quit, available resources, if not completed on admission  ( ) Referral for counseling faxed to [place]   (x ) Patient refused referral  (x ) Patient refused counseling  (x ) Patient refused smoking cessation medication upon discharge    Vaccinations (kiran X if applicable and completed):  ( ) Patient states already received influenza vaccine elsewhere  ( ) Patient received influenza vaccine during this hospitalization  (x ) Patient refused influenza vaccine at this time

## 2021-09-03 NOTE — SUICIDE SAFETY PLAN
SAFETY PLAN    A suicide Safety Plan is a document that supports someone when they are having thoughts of suicide. Warning Signs that indicate a suicidal crisis may be developing: What (situations, thoughts, feelings, body sensations, behaviors, etc.) do you experience that lets you know you are beginning to think about suicide? 1. Thinking and remembering past trauma  2. Getting yelled at  3. Being angry, depecitions of abuse    Internal Coping Strategies:  What things can I do (relaxation techniques, hobbies, physical activities, etc.) to take my mind off my problems without contacting another person? 1. Breathing when anxious  2. telling myself I cannot control others  3. Telling myself I matter    People and social settings that provide distraction: Who can I call or where can I go to distract me? 1. Name: Angi Roger  Phone:   2. Name: Licha Marino  Phone:   3. Place  : [facility]              People whom I can ask for help: Who can I call when I need help - for example, friends, family, clergy, someone else? 1. Name: Licha Marino  Phone:   2. Name: Eliane Opitz Phone:   3. Name: Angi Roger   Phone:     Professionals or [address] [place], [place] [address]vd I can contact during a crisis: Who can I call for help - for example, my doctor, my psychiatrist, my psychologist, a mental health provider, a suicide hotline? 1. Clinician Name: Jag Martínez   Phone:       Clinician Pager or Emergency Contact #:     2. Clinician Name: Ronda Morris  Phone:       Clinician Pager or Emergency Contact #:     3. Suicide Prevention Lifeline: 3-567-924-TALK (9836)    4. [address] [place] [address] Emergency Services -  for example, 174 [facility] suicide hotline, [facility] Hotline: [facility] AT [place]      Emergency Services Address: [phone]      Emergency Services Phone: [phone]    Making the environment safe:  How can I make my environment (house/apartment/living space) safer? For example, can I remove guns, medications, and other items? 1. Remember the children. Tell myself it will get better  2. Limit access to triggers/people that trigger myself  3.   Remove myself from any stressful environments when possible

## 2021-09-24 ENCOUNTER — FOLLOWUP TELEPHONE ENCOUNTER (OUTPATIENT)
Dept: PSYCHIATRY | Age: 21
End: 2021-09-24

## 2021-10-06 NOTE — DISCHARGE SUMMARY
Department of Psychiatry    Discharge Summary      Hobert Oneal Lower  [id number]    Admission date:   9/1/2021    Discharge:   Date: 9/3/2021  Location: home    Inpatient Provider: Betty Olivia MD  Unit: [facility]    Diagnosis on Admission:  Mood disorder, unspecified    Diagnosis on Discharge:  Mood disorder, unspecified    Active Hospital Problems    Diagnosis Date Noted    Opioid use disorder, moderate, in controlled environment [facility]) [F11.20] 09/03/2021    Cannabis use disorder, mild, abuse [F12.10]     Amphetamine use disorder, moderate, in controlled environment ([facility] [address] [facility] [F15.20]     Mood disorder ([facility] [F39] 09/01/2021       Reason for Admission:  From my admission note:  IDENTIFICATION:  This is a domiciled, never , employed  68-year-old without psychiatric history who was brought by mariposa to our  Emergency Department after making suicidal statements.     SOURCES OF INFORMATION:  The patient. ED record.     CHIEF COMPLAINT:  \"I texted my girlfriend something. \"     HISTORY OF PRESENT ILLNESS:  The patient reports that he and his   girlfriend had lived together with their son. They have been arguing  recently. Evidently, she broke up with him though they  continued to live together. For the last couple of weeks, he has tried  to reconcile with her unsuccessfully. At some point, she left with her  son to stay with the patient's sister. He continued to try to reconcile  with her.       Yesterday, he made a suicidal threas to her over text. When   police showed up to his apartment they found a loaded gun on the TV  stand. He had a long scratch on his arm. The police secured the  Firearm; brought it to the Police Department. Mariposa then brought him to  our Emergency Department.     The patient reports he felt he had been doing relatively well up until  the breakup.   Since then, he has struggled with a number of  symptoms including low mood, anhedonia, tearfulness, amotivation, and some  self-reproach. He says that yesterday was the first time he had any  sort of suicidal thought. He was not suicidal before he  made the text and is not suicidal now. He has some  vegetative symptoms of depression including limited eye contact and psychomotor retardation.     He says that he occasionally drinks alcohol and has a history of  substances, but had not been using recently. However, his urine drug  screen was positive for both amphetamines and cannabis. He reports  feeling safe here and willing approach staff with concerns.     PSYCHIATRIC REVIEW OF SYSTEMS:  No psychosis. No michael.     STRESSORS:  As above.     PSYCHIATRIC HISTORY:  He says he saw a psychiatrist as a toddler, but  not since. He says, he has never been hospitalized, diagnosed with mental illness, on psychiatric medications, or attempted suicide.     SUBSTANCE USE HISTORY:  He tells me he has a history of significant  substance use, but would not elaborate. He says that recently he has  only had alcohol. However, his urine drug screen was positive for  amphetamines and cannabis.     MEDICAL HISTORY:  No chronic conditions, traumatic brain injury,  seizures, or major surgeries.     FAMILY PSYCHIATRIC HISTORY:  Paternal grandmother, something, unknown  diagnosis. No suicides.     CURRENT MEDICATIONS:  None.     ALLERGIES:  PENICILLIN.     SOCIAL HISTORY:  Born in [place]. Three siblings. Parents never  . He completed the eleventh grade. He had been living with his  Girlfriend, and their son. He has never been . He is employed  full-time as a .     LEGAL HISTORY:  None.     REVIEW OF SYSTEMS:  He is not vaccinated for [place], he does not believe  he needs it. He did not describe or endorse recent headaches, changes  in vision, chest pain, shortness of breath, cough, sore throat, fevers,  abdominal pain, neurological problems, bleeding problems, or skin  problems.   He was moving all four extremities and speaking without  difficulty.     MENTAL STATUS EXAMINATION on admission:  The patient presented in personal clothes. He made little eye contact and was guarded, but opened with  conversation. Eventually, he made better eye contact. He described his  mood as, \"low\" and had a blunted affect. He had mild psychomotor  retardation.     He spoke softly. He was not pressured. He was oriented to the date,  day, place, and the context of this evaluation. His memory was intact.     His use of language, speech, and educational attainment suggested a  below average level of intellectual functioning.     His thought processes were logical and goal-directed. He did not  describe or endorse hallucinations, delusions, or homicidal thinking. He did endorse brief suicidal thinking, but reported feeling safe here  and willing to approach staff with concerns.     His ability for abstract thought was fair based on his interpretation of  simple proverbs.     Insight and judgment were fair.     PHYSICAL EXAMINATION on admission:  VITAL SIGNS:  Temperature 99.1, pulse 71, respiratory rate 16, blood  pressure 93/51. GAIT:  Normal.     LABORATORY DATA on admission:  Show a CMP with a creatinine less than 0.8, glucose of  108, albumin at 5.3, bilirubin at 1.8, otherwise, within normal limits. TSH, within normal limits. Ethanol level, not detectable. Urine drug  screen, positive for amphetamine and cannabis. Acetaminophen and  salicylate levels are below threshold. CBC, within normal limits. COVID-19, negative.     FORMULATION on admission: This is a domiciled, never , employed 18-year-old  with a history of substance use, who was brought by squad to our  Emergency Department on a statement of belief after making suicidal  statements to his girlfriend. The patient presents with symptoms of  depression and grief.   Given his behavior including making suicidal  statements to his girlfriend, self-harm, and involvement with loaded  gun, he was be admitted for further observation and  evaluation.     DIAGNOSES on admission:  1. Mood disorder, unspecified. 2.  Cannabis and amphetamine use. Hospital Course:   1. Admitted to Inpatient Psychiatry for stabilization and treatment. 2.  On admission, started Zoloft 25 mg. Risks, benefitsnd side effects were discussed with the patient at length. Ordered q. 15-minute checks for safety,, a programming, and p.r.n.  medication for anxiety, agitation, and insomnia. 9/3 - increased Zoloft to 50mg daily. Mr. Christiana Tirado stabilized with treatment including sobriety, medication, and programming. His mood stabilized, his SI resolved, and he became future oriented. He was active in the milieu and attended some groups. He tolerated Zoloft well and without side effects. He demonstrated safe behavior throughout this admission. He committed to continuing treatment after discharge including meeting with me for a bridge appointment. 3.  Internal Medicine consult for admission. No additional findings. 4.   admitted on a statement of belief but agreed to stay voluntarily. Complications: none;  Camryn Moreno did not require emergency psychiatric intervention during this admission such as restraint or emergency medication. Vital signs in last 24 hours:  Vitals:    09/03/21 0745   BP: (!) 120/56   Pulse: 61   Resp: 16   Temp: 99.1 °F (37.3 °C)   SpO2: 97%       Mental Status Examination on Discharge:    Appearance: good grooming and hygiene  Behavior/Attitude toward examiner:  cooperative, attentive, fair eye contact  Speech: Normal rate, volume, amount  Mood:  \"better\"  Affect:  mood congruent   Thought processes:  Goal directed, linear  Thought Content:  no SI, no HI, no I/D/IOR  Perceptions: no AVH  Attention: intact   Abstraction: intact  Cognition:  intact   Insight: fair  Judgment: intact     Discharge on regular diet, continue activity as tolerated. Condition on Discharge:  Rodger Moreno was in stable condition. Rodger Moreno did not have suicidal or homicidal thoughts, and was future oriented. Rodger Moreno did not represent an imminent risk to self or others. However, given static risk factors, Rodger Moreno remains at perpetual elevated risk going forward. Medication List      START taking these medications    nicotine 21 MG/24HR  Commonly known as: NICODERM CQ  Place 1 patch onto the skin daily for 15 days     sertraline 50 MG tablet  Commonly known as: ZOLOFT  Take 1 tablet by mouth daily           Where to Get Your Medications      These medications were sent to [facility], [address] [place], [place] [address], [address] [address]    Phone: [phone]   · sertraline 50 MG tablet     You can get these medications from any pharmacy    Bring a paper prescription for each of these medications  · nicotine 21 MG/24HR         Follow-up Plan: The following was given to the patient at discharge: The Crisis Number for [facility] is [phone](YKKK). This Hotline may be accessed 24 hours a day, 7 days a week. Please follow up with your PCP regarding any pending labs. Your next appointment is:  Name of Provider: Claudia  Provider specialty/license: counseling/medication management   Date and time of appointment: 9/7/21 at 9:00am open enrollment  The type/s of services requested are: counseling/medication management  Agency name: [facility]  Address: [address] [place] [address]  Phone Number: [phone]  Special instructions (what to bring to appointment, etc.): Valid ID, insurance card, proof of residence (mail), proof of income (check stubs, award letter, tax return).  If you are unable to attend the open enrollment date and time above please plan to attend open enrollment any Monday-Thursday 8:00am-4:30pm or Firday 8:00am-3:00pm    Name of Provider: JASIEL NASH  Provider specialty/license: counseling/medication management for substance use disorder   Date and time of appointment: 9/7/21 at 1:00pm open enrollment  The type/s of services requested are: counseling/medication management  Agency name: [facility] ANA ROSA NASH  Address: [place]asmine [address] [place] [address]  Phone Number: [phone]  Special instructions (what to bring to appointment, etc.): Valid ID, insurance card, proof of residence (mail), proof of income (check stubs, award letter, tax return). If you are unable to attend the open enrollment date and time above please plan to attend open enrollment any Monday, Tuesday, Thursday 8:30am-4:00pm or Wednesday 8:30am-6:00pm    Other appointments:   Name of Provider: Dr. Timm Kussmaul   Provider specialty/license: MD   Date and time of appointment: 9/27/21 at 11:00am   The type/s of services requested are: medication management  Agency name: [facility]  Address: [address] [place] [address]. [place] [address]  Phone Number: [phone]  Special instructions (what to bring to appointment, etc.): Valid ID, insurance card. Please arrive 15 minutes early and go to registration in the main front lobby. Once you have registered they will direct you to your appointment     **With the current concerns for Coronavirus (COVID-19), please contact your providers prior to going in to their offices. [facility] and agencies have adjusted their practices to reduce spread of the illness. If you have any questions about the virus or recommendations for home care, please call the [address]  COVID-19 hotline at [phone]. For all emergencies, please contact 911. **    More than 30 minutes were spent with the patient in completing this  evaluation and more than 50% of the time was spent completing this  evaluation, providing counseling, and planning

## 2022-02-09 ENCOUNTER — HOSPITAL ENCOUNTER (EMERGENCY)
Age: 22
Discharge: HOME | End: 2022-02-09
Payer: COMMERCIAL

## 2022-02-09 VITALS
RESPIRATION RATE: 15 BRPM | HEART RATE: 87 BPM | DIASTOLIC BLOOD PRESSURE: 85 MMHG | OXYGEN SATURATION: 99 % | SYSTOLIC BLOOD PRESSURE: 138 MMHG | TEMPERATURE: 98 F

## 2022-02-09 DIAGNOSIS — B86 SCABIES: ICD-10-CM

## 2022-02-09 DIAGNOSIS — L29.9 GENERALIZED PRURITUS: Primary | ICD-10-CM

## 2022-02-09 LAB
BILIRUBIN URINE: NEGATIVE
BLOOD, URINE: ABNORMAL
CLARITY: CLEAR
COLOR: YELLOW
EPITHELIAL CELLS, UA: NORMAL /HPF (ref 0–5)
GLUCOSE URINE: NEGATIVE MG/DL
KETONES, URINE: NEGATIVE MG/DL
LEUKOCYTE ESTERASE, URINE: NEGATIVE
MICROSCOPIC EXAMINATION: YES
NITRITE, URINE: NEGATIVE
PH UA: 6 (ref 5–8)
PROTEIN UA: NEGATIVE MG/DL
RBC UA: NORMAL /HPF (ref 0–4)
SPECIFIC GRAVITY UA: 1.01 (ref 1–1.03)
URINE REFLEX TO CULTURE: ABNORMAL
URINE TYPE: ABNORMAL
UROBILINOGEN, URINE: 0.2 E.U./DL
WBC UA: NORMAL /HPF (ref 0–5)

## 2022-02-09 PROCEDURE — 87491 CHLMYD TRACH DNA AMP PROBE: CPT

## 2022-02-09 PROCEDURE — 81001 URINALYSIS AUTO W/SCOPE: CPT

## 2022-02-09 PROCEDURE — 99283 EMERGENCY DEPT VISIT LOW MDM: CPT

## 2022-02-09 PROCEDURE — 87591 N.GONORRHOEAE DNA AMP PROB: CPT

## 2022-02-09 RX ORDER — PERMETHRIN 50 MG/G
CREAM TOPICAL
Qty: 60 G | Refills: 1 | Status: SHIPPED | OUTPATIENT
Start: 2022-02-09

## 2022-02-09 RX ORDER — CLOTRIMAZOLE AND BETAMETHASONE DIPROPIONATE 10; .64 MG/G; MG/G
CREAM TOPICAL
Qty: 15 G | Refills: 0 | Status: SHIPPED | OUTPATIENT
Start: 2022-02-09

## 2022-02-09 ASSESSMENT — ENCOUNTER SYMPTOMS
EYE REDNESS: 0
SINUS PRESSURE: 0
DIARRHEA: 0
COUGH: 0
RHINORRHEA: 0
NAUSEA: 0
VOMITING: 0
CONSTIPATION: 0
SHORTNESS OF BREATH: 0
ABDOMINAL PAIN: 0
SORE THROAT: 0
CHEST TIGHTNESS: 0
SINUS PAIN: 0
EYE DISCHARGE: 0

## 2022-02-10 NOTE — ED PROVIDER NOTES
[address] ED  EMERGENCY DEPARTMENT ENCOUNTER        Pt Name: Sergey Moreno  MRN: [id number]  Armstrongfshelley 2000  Date of evaluation: 2/9/2022  Provider: Laura Davidson PA-C  PCP: No primary care provider on file. This patient was not seen and evaluated by the attending physician  I have independently evaluated this patient. CHIEF COMPLAINT       Chief Complaint   Patient presents with    Exposure to STD     Patient states that he believes that he may have an STD, states that the \"head of his jackson is discolored. \" And that his face is also \"itchy. \"       HISTORY OF PRESENT ILLNESS   (Location/Symptom, Timing/Onset, Context/Setting, Quality, Duration, Modifying Factors, Severity)  Note limiting factors. Sergey Moreno is a 19 y.o. male for valuation of a 1 week history of generalized diffuse itchiness it began on his scalp and face, and has progressed to his upper and lower extremities. Patient also noticed 2 days ago that he had a abnormal discoloration to his penis. Patient denies any lesions no dysuria hematuria no abnormal discharge. Patient denies any new sexual partners and unprotected intercourse. Patient advised that his symptoms began after he let several of his friends move into his apartment and he is living in very close quarters. No fevers. Nursing Notes were all reviewed and agreed with or any disagreements were addressed  in the HPI. REVIEW OF SYSTEMS  (2-9 systems for level 4, 10 or more for level 5)     Review of Systems   Constitutional: Negative for chills and fever. HENT: Negative. Negative for congestion, rhinorrhea, sinus pressure, sinus pain and sore throat. Eyes: Negative for discharge, redness and visual disturbance. Respiratory: Negative for cough, chest tightness and shortness of breath. Cardiovascular: Negative for chest pain and palpitations.    Gastrointestinal: Negative for abdominal pain, constipation, diarrhea, nausea and vomiting. Genitourinary: Negative for difficulty urinating, dysuria and frequency. Musculoskeletal: Negative. Skin: Positive for rash. Neurological: Negative. Negative for dizziness, weakness, numbness and headaches. Psychiatric/Behavioral: Negative. All other systems reviewed and are negative. Positivesand Pertinent negatives as per HPI. Except as noted above in the ROS, all other systems were reviewed and negative. PAST MEDICAL HISTORY     Past Medical History:   Diagnosis Date    Kidney duplication          SURGICAL HISTORY     No past surgical history on file. CURRENT MEDICATIONS       Discharge Medication List as of 2/9/2022 10:25 PM      CONTINUE these medications which have NOT CHANGED    Details   sertraline (ZOLOFT) 50 MG tablet Take 1 tablet by mouth daily, Disp-30 tablet, R-0Normal      nicotine (NICODERM CQ) 21 MG/24HR Place 1 patch onto the skin daily for 15 days, Disp-15 patch, R-0Print               ALLERGIES     Penicillins    FAMILY HISTORY     No family history on file.       SOCIAL HISTORY       Social History     Socioeconomic History    Marital status: Single     Spouse name: Not on file    Number of children: 1    Years of education: 6    Highest education level: Not on file   Occupational History    Not on file   Tobacco Use    Smoking status: Current Every Day Smoker     Packs/day: 1.00     Years: 12.00     Pack years: 12.00     Types: Cigarettes    Smokeless tobacco: Never Used   Vaping Use    Vaping Use: Some days    Substances: Nicotine   Substance and Sexual Activity    Alcohol use: Yes     Comment: occassionally    Drug use: No    Sexual activity: Yes     Partners: Female   Other Topics Concern    Not on file   Social History Narrative    Not on file     Social Determinants of Health     Financial Resource Strain:     Difficulty of Paying Living Expenses: Not on file   Food Insecurity:     Worried About Running Out of Food in the Last Year: Not on file    Ran Out of Food in the Last Year: Not on file   Transportation Needs:     Lack of Transportation (Medical): Not on file    Lack of Transportation (Non-Medical): Not on file   Physical Activity:     Days of Exercise per Week: Not on file    Minutes of Exercise per Session: Not on file   Stress:     Feeling of Stress : Not on file   Social Connections:     Frequency of Communication with Friends and Family: Not on file    Frequency of Social Gatherings with Friends and Family: Not on file    Attends Scientologist Services: Not on file    Active Member of [address] [place] [address] [employer] or Organizations: Not on file    Attends Club or Organization Meetings: Not on file    Marital Status: Not on file   Intimate Partner Violence:     Fear of Current or Ex-Partner: Not on file    Emotionally Abused: Not on file    Physically Abused: Not on file    Sexually Abused: Not on file   Housing Stability:     Unable to Pay for Housing in the Last Year: Not on file    Number of [place] in the Last Year: Not on file    Unstable Housing in the Last Year: Not on file       SCREENINGS     NIH Score       Glascow [place] Coma Scale  Eye Opening: Spontaneous  Best Verbal Response: Oriented  Best Motor Response: Obeys commands  [place] Coma Scale Score: 15    [facility]     Heart Score         PHYSICAL EXAM    (up to 7 for level 4, 8 ormore for level 5)     ED Triage Vitals [02/09/22 2127]   BP Temp Temp Source Pulse Resp SpO2 Height Weight   (!) 143/84 98 °F (36.7 °C) Temporal 97 15 100 % -- --       Physical Exam  Vitals and nursing note reviewed. Exam conducted with a chaperone present (chaperoned by natividad munguia rn). Constitutional:       Appearance: He is well-developed. He is not diaphoretic. HENT:      Head: Normocephalic. Nose: Nose normal.      Mouth/Throat:      Pharynx: No oropharyngeal exudate. Eyes:      General:         Right eye: No discharge. Left eye: No discharge. Conjunctiva/sclera: Conjunctivae normal.      Pupils: Pupils are equal, round, and reactive to light. Cardiovascular:      Rate and Rhythm: Normal rate and regular rhythm. Heart sounds: Normal heart sounds. No murmur heard. No friction rub. No gallop. Pulmonary:      Effort: Pulmonary effort is normal. No respiratory distress. Breath sounds: Normal breath sounds. No wheezing. Abdominal:      General: Bowel sounds are normal. There is no distension. Palpations: Abdomen is soft. Tenderness: There is no abdominal tenderness. Genitourinary:     Pubic Area: No rash or pubic lice. Penis: Circumcised. Testes: Normal. Cremasteric reflex is present. Epididymis:      Right: Normal.      Left: Normal.       Musculoskeletal:         General: Normal range of motion. Cervical back: Normal range of motion. Skin:     General: Skin is warm and dry. Capillary Refill: Capillary refill takes less than 2 seconds. Findings: Rash present. Comments: excoriations throughout    Neurological:      General: No focal deficit present. Mental Status: He is alert. Psychiatric:         Behavior: Behavior normal.           DIAGNOSTIC RESULTS   LABS:    Labs Reviewed   URINE RT REFLEX TO CULTURE - Abnormal; Notable for the following components:       Result Value    Blood, Urine TRACE-INTACT (*)     All other components within normal limits    Narrative:     Performed at:  [facility] - [facility],  [place], [facility]   Phone [phone]   C.TRACHOMATIS N.GONORRHOEAE DNA, URINE   MICROSCOPIC URINALYSIS       All other labs were within normal range or notreturned as of this dictation.         EMERGENCY DEPARTMENT COURSE and DIFFERENTIAL DIAGNOSIS/MDM:   Vitals:    Vitals:    02/09/22 2127 02/09/22 2230   BP: (!) 143/84 138/85   Pulse: 97 87   Resp: 15 15   Temp: 98 °F (36.7 °C)    TempSrc: Temporal    SpO2: 100% 99%       Patient was given the following medications:  Medications - No data to display      Afebrile, stable, patient presents to the ED for evaluation. Toxic patient in no acute distress SPO2 on room air of 99% the patient's not hypoxic. Patient reports having a pruritic rash to his scalp and face upper extremities lower extremities. Onset after he left given a move in with him. Consistent with scabies history no obvious lesions although clearly patient has been itching. Patient also reports having several days of hypopigmentation. To his penis. I feel likely this is fungal in origin we will start with a low-dose antifungal and provide patient a referral to dermatology. Patient is concerned it is an STI. He reports he has not had any new sexual partners for several months. Denies any dysuria lesions masses abnormal drainage. I discussed urinalysis in addition to gonorrhea chlamydia testing we will hold off on treating at this time. all questions are answered. Indications for return to the ED are discussed. Patient is advised if any new or worsening symptoms arise they should immediately return to the emergency room. Follow-up with primary care in 1-2 days. The patient tolerated their visit well. The patient and / or the family were informed of the results of any tests, a time was given to answer questions, a plan was proposed and they agreed with plan. I estimate there is LOW risk for CELLULITIS, COMPARTMENT SYNDROME, NECROTIZING FASCIITIS, TENDON OR NEUROVASCULAR INJURY, or FOREIGN BODY, thus I consider the discharge disposition reasonable. Also, there is no evidence or peritonitis, sepsis, or toxicity. [address] [place] [address] and I have discussed the diagnosis and risks, and we agree with discharging home to follow-up with their primary doctor. We also discussed returning to the Emergency Department immediately if new or worsening symptoms occur.  We have discussed the symptoms which are most concerning (e.g.,

## 2022-02-10 NOTE — ED NOTES
Chaperone provided for [employer] PA during genital examination, urine specimen obtained and sent to lab.      Kaylee Overton RN  02/09/22 2124

## 2022-02-11 LAB
C. TRACHOMATIS DNA ,URINE: NEGATIVE
N. GONORRHOEAE DNA, URINE: NEGATIVE

## 2024-05-10 ENCOUNTER — HOSPITAL ENCOUNTER (EMERGENCY)
Age: 24
Discharge: HOME | End: 2024-05-10
Payer: COMMERCIAL

## 2024-05-10 VITALS
RESPIRATION RATE: 16 BRPM | HEART RATE: 85 BPM | OXYGEN SATURATION: 100 % | DIASTOLIC BLOOD PRESSURE: 75 MMHG | BODY MASS INDEX: 22.22 KG/M2 | TEMPERATURE: 97.6 F | WEIGHT: 150 LBS | SYSTOLIC BLOOD PRESSURE: 117 MMHG | HEIGHT: 69 IN

## 2024-05-10 DIAGNOSIS — R21 RASH AND OTHER NONSPECIFIC SKIN ERUPTION: Primary | ICD-10-CM

## 2024-05-10 PROCEDURE — 99283 EMERGENCY DEPT VISIT LOW MDM: CPT

## 2024-05-10 PROCEDURE — 6370000000 HC RX 637 (ALT 250 FOR IP): Performed by: REGISTERED NURSE

## 2024-05-10 RX ORDER — PREDNISONE 20 MG/1
40 TABLET ORAL ONCE
Status: COMPLETED | OUTPATIENT
Start: 2024-05-10 | End: 2024-05-10

## 2024-05-10 RX ORDER — PREDNISONE 10 MG/1
TABLET ORAL
Qty: 30 TABLET | Refills: 0 | Status: SHIPPED | OUTPATIENT
Start: 2024-05-10

## 2024-05-10 RX ORDER — DIPHENHYDRAMINE HCL 25 MG
25 TABLET ORAL ONCE
Status: COMPLETED | OUTPATIENT
Start: 2024-05-10 | End: 2024-05-10

## 2024-05-10 RX ADMIN — DIPHENHYDRAMINE HCL 25 MG: 25 TABLET ORAL at 21:03

## 2024-05-10 RX ADMIN — PREDNISONE 40 MG: 20 TABLET ORAL at 21:03

## 2024-05-10 ASSESSMENT — PAIN DESCRIPTION - ONSET: ONSET: GRADUAL

## 2024-05-10 ASSESSMENT — PAIN SCALES - GENERAL: PAINLEVEL_OUTOF10: 5

## 2024-05-10 ASSESSMENT — PAIN DESCRIPTION - PAIN TYPE: TYPE: ACUTE PAIN

## 2024-05-10 ASSESSMENT — PAIN DESCRIPTION - FREQUENCY: FREQUENCY: CONTINUOUS

## 2024-05-10 ASSESSMENT — PAIN DESCRIPTION - DESCRIPTORS: DESCRIPTORS: ITCHING

## 2024-05-10 ASSESSMENT — PAIN - FUNCTIONAL ASSESSMENT: PAIN_FUNCTIONAL_ASSESSMENT: 0-10

## 2024-05-12 NOTE — ED PROVIDER NOTES
Exclusion criteria - the patient is NOT to be included for SEP-1 Core Measure due to:  Infection is not suspected    Discharge Time out:  CC Reviewed Yes   Test Results Yes     Vitals:    05/10/24 2026   BP: 117/75   Pulse: 85   Resp: 16   Temp: 97.6 °F (36.4 °C)   SpO2: 100%              FINAL IMPRESSION      1. Rash and other nonspecific skin eruption          DISPOSITION/PLAN   DISPOSITION Decision To Discharge 05/10/2024 09:00:01 PM      PATIENT REFERREDTO:  [facility] ED  [address] [address]  [phone]    As needed, If symptoms worsen    [facility]  [phone]          DISCHARGE MEDICATIONS:  Discharge Medication List as of 5/10/2024  9:04 PM        START taking these medications    Details   predniSONE (DELTASONE) 10 MG tablet Take 4 tablets by mouth for 3 days, followed by 3 tablets by mouth for 3 days, followed by 2 tablets by mouth for 3 days, followed by 1 tablet by mouth for 3 days, Disp-30 tablet, R-0Normal             DISCONTINUED MEDICATIONS:  Discharge Medication List as of 5/10/2024  9:04 PM                 (Please note that portions ofthis note were completed with a voice recognition program.  Efforts were made to edit the dictations but occasionally words are mis-transcribed.)    MASOUD Hightower CNP (electronically signed)       Manda Nix APRN - CNP  05/12/24 0024

## 2025-01-19 ENCOUNTER — HOSPITAL ENCOUNTER (EMERGENCY)
Age: 25
Discharge: HOME | End: 2025-01-19
Payer: COMMERCIAL

## 2025-01-19 VITALS
HEIGHT: 69 IN | RESPIRATION RATE: 16 BRPM | DIASTOLIC BLOOD PRESSURE: 86 MMHG | WEIGHT: 146.2 LBS | OXYGEN SATURATION: 99 % | TEMPERATURE: 98.2 F | BODY MASS INDEX: 21.66 KG/M2 | HEART RATE: 87 BPM | SYSTOLIC BLOOD PRESSURE: 139 MMHG

## 2025-01-19 DIAGNOSIS — L03.811 CELLULITIS OF HEAD EXCEPT FACE: ICD-10-CM

## 2025-01-19 DIAGNOSIS — L73.9 FOLLICULITIS: Primary | ICD-10-CM

## 2025-01-19 DIAGNOSIS — R21 RASH AND OTHER NONSPECIFIC SKIN ERUPTION: ICD-10-CM

## 2025-01-19 PROCEDURE — 99283 EMERGENCY DEPT VISIT LOW MDM: CPT

## 2025-01-19 RX ORDER — CEPHALEXIN 500 MG/1
500 CAPSULE ORAL 4 TIMES DAILY
Qty: 28 CAPSULE | Refills: 0 | Status: SHIPPED | OUTPATIENT
Start: 2025-01-19 | End: 2025-01-26

## 2025-01-19 ASSESSMENT — PAIN - FUNCTIONAL ASSESSMENT: PAIN_FUNCTIONAL_ASSESSMENT: 0-10

## 2025-01-19 ASSESSMENT — PAIN SCALES - GENERAL: PAINLEVEL_OUTOF10: 0

## 2025-01-19 NOTE — ED NOTES
Discharge paperwork given to and reviewed with pt. Pt verbalized understanding and all questions answered. Pt encouraged to return if having worsening symptoms or new symptoms discussed in discharge paperwork.  Pt to follow up with PCP  Rx x 1 given and medications reviewed with pt.   Pt in NAD, RR even and unlabored. Pt off unit ambulatory

## 2025-01-19 NOTE — ED PROVIDER NOTES
[facility] EMERGENCY DEPARTMENT  EMERGENCY DEPARTMENT ENCOUNTER        Pt Name: Baldemar Moreno  MRN: [id number]  Birthdate 2000  Date of evaluation: 1/19/2025  Provider: MASOUD Hightower CNP  PCP: No primary care provider on file.    This patient was not seen and evaluated by the attending physician No att. providers found.    I have evaluated this patient. My supervising physician was available for consultation.      CHIEF COMPLAINT       Chief Complaint   Patient presents with    Rash     Pt reports that he has had irritation to his scalp x2 weeks       HISTORY OF PRESENT ILLNESS   (Location/Symptom, Timing/Onset, Context/Setting, Quality, Duration, Modifying Factors, Severity)  Note limiting factors.     History from : Patient  Baldemar Moreno is a 24 y.o. male who presents via private car for evaluation of irritation to his scalp.  Patient states that he has noticed this for the last couple of weeks.  He has been treating it with tea tree oil and says it seems to be getting better but he feels like today it has been more painful.  He denies any fevers or chills.  He denies any injuries.  He states previously that he has been treated for an infection to his scalp and is worried that it is back.      Chart review reveals pt has significant PMHx of mood disorder, amphetamine use disorder, opioid use disorder. They take no medications.     Nursing Notes were all reviewed and agreed with or any disagreements were addressed  in the HPI.      REVIEW OF SYSTEMS    (2-9 systems for level 4, 10 or more for level 5)     Review of Systems   Constitutional:  Negative for chills and fever.   Skin:  Positive for color change and wound.       Positives and Pertinent negatives as per HPI.  Except as noted abovein the ROS, all other systems were reviewed and negative.       PAST MEDICAL HISTORY     Past Medical History:   Diagnosis Date    Kidney duplication          SURGICAL HISTORY   History

## 2025-05-30 ENCOUNTER — HOSPITAL ENCOUNTER (EMERGENCY)
Age: 25
Discharge: HOME | End: 2025-05-30
Payer: COMMERCIAL

## 2025-05-30 VITALS
BODY MASS INDEX: 21.71 KG/M2 | OXYGEN SATURATION: 100 % | HEART RATE: 57 BPM | TEMPERATURE: 98.7 F | DIASTOLIC BLOOD PRESSURE: 82 MMHG | WEIGHT: 147 LBS | RESPIRATION RATE: 16 BRPM | SYSTOLIC BLOOD PRESSURE: 137 MMHG

## 2025-05-30 DIAGNOSIS — Z23 NEED FOR DTAP VACCINATION: ICD-10-CM

## 2025-05-30 DIAGNOSIS — S61.411A LACERATION OF RIGHT HAND WITHOUT FOREIGN BODY, INITIAL ENCOUNTER: Primary | ICD-10-CM

## 2025-05-30 PROCEDURE — 12001 RPR S/N/AX/GEN/TRNK 2.5CM/<: CPT

## 2025-05-30 PROCEDURE — 99284 EMERGENCY DEPT VISIT MOD MDM: CPT

## 2025-05-30 PROCEDURE — 6360000002 HC RX W HCPCS: Performed by: PHYSICIAN ASSISTANT

## 2025-05-30 PROCEDURE — 90471 IMMUNIZATION ADMIN: CPT | Performed by: PHYSICIAN ASSISTANT

## 2025-05-30 PROCEDURE — 90715 TDAP VACCINE 7 YRS/> IM: CPT | Performed by: PHYSICIAN ASSISTANT

## 2025-05-30 RX ADMIN — TETANUS TOXOID, REDUCED DIPHTHERIA TOXOID AND ACELLULAR PERTUSSIS VACCINE, ADSORBED 0.5 ML: 5; 2.5; 8; 8; 2.5 SUSPENSION INTRAMUSCULAR at 14:31

## 2025-05-30 ASSESSMENT — PAIN SCALES - GENERAL: PAINLEVEL_OUTOF10: 0

## 2025-05-30 ASSESSMENT — PAIN - FUNCTIONAL ASSESSMENT: PAIN_FUNCTIONAL_ASSESSMENT: 0-10

## 2025-05-30 NOTE — ED NOTES
Patient's right hand wrapped in a non adhesive dressing. Pt given instruction for donning on and off. He verbalized understanding.

## 2025-05-30 NOTE — ED PROVIDER NOTES
[facility] EMERGENCY DEPARTMENT  eMERGENCY dEPARTMENT eNCOUnter        Pt Name: Baldemar Moreno  MRN: [id number]  Birthdate 2000  Date of evaluation: 5/30/2025  Provider: GUI CLAROS PA-C  PCP: No primary care provider on file.  ED Attending: MD Jerrod    GIANNA patient. This patient was not seen by the ED attending, though they were available to consult.  History is provided by the patient. No limitations.     CHIEF COMPLAINT:  Hand Laceration (Pt reports he was helping to clean out a house when he got his right hand cut on broken glass. )      HISTORY OF PRESENT ILLNESS:  Baldemar Moreno is a 24 y.o. male who presents to the ED via private vehicle with complaints of laceration right hand.  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  He was cleaning out a house today.  He states there was some broken glass in the trash bag and when he picked it up the glass poked through and cut his right hand.  He has a 2 cm laceration to the ulnar aspect of his hand.  Bleeding is controlled with pressure.  There is no pulsatile bleeding.  He is left-hand dominant.  He has no idea when he last had a Tdap.  No other complaints, modifying factors or associated symptoms.     Nursing notes reviewed.   Past Medical History:   Diagnosis Date    Kidney duplication      No past surgical history on file.  No family history on file.  Social History     Socioeconomic History    Marital status: Single     Spouse name: Not on file    Number of children: 1    Years of education: 11    Highest education level: Not on file   Occupational History    Not on file   Tobacco Use    Smoking status: Every Day     Current packs/day: 1.00     Average packs/day: 1 pack/day for 12.0 years (12.0 ttl pk-yrs)     Types: Cigarettes    Smokeless tobacco: Never   Vaping Use    Vaping status: Never Used   Substance and Sexual Activity    Alcohol use: Yes     Comment: occassionally    Drug use: Yes     Types: Marijuana (Weed)     Comment: subutex